# Patient Record
Sex: FEMALE | Race: WHITE | NOT HISPANIC OR LATINO | URBAN - METROPOLITAN AREA
[De-identification: names, ages, dates, MRNs, and addresses within clinical notes are randomized per-mention and may not be internally consistent; named-entity substitution may affect disease eponyms.]

---

## 2021-03-03 ENCOUNTER — TELEPHONE (OUTPATIENT)
Dept: PSYCHIATRY | Facility: CLINIC | Age: 26
End: 2021-03-03

## 2021-03-03 NOTE — TELEPHONE ENCOUNTER
Imelda called for her daughter Krystal   Claudia's address and phone number are up to date in her chart  Claudia has [employer]  Imelda is looking on getting her scheduled with Dr Dacosta       You can reach elier at [phone]

## 2021-03-08 NOTE — TELEPHONE ENCOUNTER
Pt returned your call  Would like you to call her at [phone]   initial call was made to her Mom     please call [phone]